# Patient Record
Sex: MALE | Race: WHITE | URBAN - METROPOLITAN AREA
[De-identification: names, ages, dates, MRNs, and addresses within clinical notes are randomized per-mention and may not be internally consistent; named-entity substitution may affect disease eponyms.]

---

## 2023-02-22 ENCOUNTER — APPOINTMENT (RX ONLY)
Dept: URBAN - METROPOLITAN AREA CLINIC 113 | Facility: CLINIC | Age: 53
Setting detail: DERMATOLOGY
End: 2023-02-22

## 2023-02-22 DIAGNOSIS — L29.8 OTHER PRURITUS: ICD-10-CM

## 2023-02-22 DIAGNOSIS — L29.89 OTHER PRURITUS: ICD-10-CM

## 2023-02-22 DIAGNOSIS — R21 RASH AND OTHER NONSPECIFIC SKIN ERUPTION: ICD-10-CM

## 2023-02-22 PROCEDURE — ? TREATMENT REGIMEN

## 2023-02-22 PROCEDURE — 99202 OFFICE O/P NEW SF 15 MIN: CPT | Mod: 25

## 2023-02-22 PROCEDURE — ? BIOPSY BY PUNCH METHOD

## 2023-02-22 PROCEDURE — ? COUNSELING

## 2023-02-22 PROCEDURE — 11104 PUNCH BX SKIN SINGLE LESION: CPT

## 2023-02-22 ASSESSMENT — LOCATION DETAILED DESCRIPTION DERM
LOCATION DETAILED: RIGHT ANTERIOR MEDIAL MALLEOLUS
LOCATION DETAILED: RIGHT ANKLE

## 2023-02-22 ASSESSMENT — LOCATION ZONE DERM: LOCATION ZONE: LEG

## 2023-02-22 ASSESSMENT — LOCATION SIMPLE DESCRIPTION DERM: LOCATION SIMPLE: RIGHT ANKLE

## 2023-02-22 NOTE — PROCEDURE: BIOPSY BY PUNCH METHOD

## 2023-02-22 NOTE — PROCEDURE: TREATMENT REGIMEN
Plan: Pt will be out of town and will remove the suture on his own.  We will discuss result over the phone and plan to send triamcinolone 0.5 at that time
Detail Level: Detailed